# Patient Record
Sex: MALE | Race: WHITE | NOT HISPANIC OR LATINO | URBAN - METROPOLITAN AREA
[De-identification: names, ages, dates, MRNs, and addresses within clinical notes are randomized per-mention and may not be internally consistent; named-entity substitution may affect disease eponyms.]

---

## 2017-12-09 ENCOUNTER — EMERGENCY (EMERGENCY)
Facility: HOSPITAL | Age: 47
LOS: 1 days | Discharge: HOME | End: 2017-12-09
Attending: EMERGENCY MEDICINE | Admitting: EMERGENCY MEDICINE
Payer: COMMERCIAL

## 2017-12-09 VITALS — WEIGHT: 190.04 LBS | HEIGHT: 69 IN

## 2017-12-09 VITALS
HEART RATE: 91 BPM | SYSTOLIC BLOOD PRESSURE: 132 MMHG | OXYGEN SATURATION: 98 % | TEMPERATURE: 98 F | DIASTOLIC BLOOD PRESSURE: 87 MMHG | RESPIRATION RATE: 18 BRPM

## 2017-12-09 DIAGNOSIS — Z98.890 OTHER SPECIFIED POSTPROCEDURAL STATES: Chronic | ICD-10-CM

## 2017-12-09 PROCEDURE — 73564 X-RAY EXAM KNEE 4 OR MORE: CPT

## 2017-12-09 PROCEDURE — 99283 EMERGENCY DEPT VISIT LOW MDM: CPT | Mod: 25

## 2017-12-09 PROCEDURE — 99284 EMERGENCY DEPT VISIT MOD MDM: CPT

## 2017-12-09 PROCEDURE — 73564 X-RAY EXAM KNEE 4 OR MORE: CPT | Mod: 26,LT

## 2017-12-09 RX ORDER — ESOMEPRAZOLE MAGNESIUM 40 MG/1
0 CAPSULE, DELAYED RELEASE ORAL
Qty: 0 | Refills: 0 | COMMUNITY

## 2017-12-09 RX ORDER — IBUPROFEN 200 MG
600 TABLET ORAL ONCE
Qty: 0 | Refills: 0 | Status: COMPLETED | OUTPATIENT
Start: 2017-12-09 | End: 2017-12-09

## 2017-12-09 RX ADMIN — Medication 600 MILLIGRAM(S): at 14:40

## 2017-12-09 RX ADMIN — Medication 600 MILLIGRAM(S): at 14:06

## 2017-12-09 NOTE — ED PROVIDER NOTE - MUSCULOSKELETAL, MLM
L knee: ROM limited 2/2 pain. +swelling in posterior patellar fossa, tenderness overlying. No medial, lateral, or anterior TTP. negative anterior and posterior drawer test. Subjective pain elicited on internal rotation of knee. +Some lumbar paraspinal TTP. FROM of toes and ankles of L leg. Sensation intact in L foot. Cap refill <2 seconds in all toes.

## 2017-12-09 NOTE — ED ADULT NURSE NOTE - ED STAT RN HANDOFF DETAILS
hand off given to oncoming RN. Awaiting xray results hand off given to oncoming RN Marissa Vee. Awaiting xray results

## 2017-12-09 NOTE — ED PROVIDER NOTE - ATTENDING CONTRIBUTION TO CARE
48yo M [facility]  BIBEMS for L knee pain and posterior knee swelling. Pt states was at work ambulating when L foot went into a pothole causing his lower leg to turn opposite direction of his upper leg and body. No fall to ground. No joint dislocation. Has been ambulatory, however notes pain and swelling to back of L knee worse with ambulation and palpation. Palp DP and PT pulses on L, thigh and calf compartments are soft. L knee w TTP and mild swelling to posterior aspect of knee. Anterior and posterior drawer test with no ligamentous laxity. Suspect muscular injury. Will obtain Xrays, place knee immobilizer, crutches and send for FU with [facility] physician and ultimate ortho referral.

## 2017-12-09 NOTE — ED PROVIDER NOTE - MEDICAL DECISION MAKING DETAILS
47M p/w L knee injury s/p stepping into pothole with twisting motion. Mechanism and PE consistent with ligamentous vs muscular vs musculoligamentous injury. Unlikely to be dislocated or fx, but will order xrays to rule out definitively. Ibuprofen for swelling. Will apply knee immobilizer, give crutches. Pt will follow up with his [facility] doctor tomorrow and will get an orthopedic referral there. 47M p/w L knee injury s/p stepping into pothole with twisting motion. Mechanism and PE consistent with ligamentous vs muscular vs musculoligamentous injury. Unlikely to be dislocated or fx, but will order xrays to rule out definitively. Ibuprofen for swelling. Will apply knee immobilizer, give crutches. Pt will follow up with his [facility] doctor tomorrow and will get an orthopedic referral there.  Justice: See attending statement below

## 2017-12-09 NOTE — ED PROVIDER NOTE - CARE PLAN
Principal Discharge DX:	Injury of left knee, initial encounter  Instructions for follow-up, activity and diet:	You were seen today for an injury of your left knee. We ruled out a fracture and dislocation with the xray. However, it seems like you most likely have an injury to your muscles, ligaments of the knee, or a combination of both. You need to see an orthopedist so please get a referral from your [facility] physician tomorrow. Please keep the knee elevated, apply ice to the back of the knee, and take up to 600mg of ibuprofen (motrin or advil) every 6 hours for pain and swelling. Keep the knee immobilizer on until you see your doctor and use the crutches to walk.  If you start having numbness of the leg, being unable to move your toes, any difference in the pain that you're having, or for any other emergent concern please come back to the ED.

## 2017-12-09 NOTE — ED PROVIDER NOTE - PLAN OF CARE
You were seen today for an injury of your left knee. We ruled out a fracture and dislocation with the xray. However, it seems like you most likely have an injury to your muscles, ligaments of the knee, or a combination of both. You need to see an orthopedist so please get a referral from your [facility] physician tomorrow. Please keep the knee elevated, apply ice to the back of the knee, and take up to 600mg of ibuprofen (motrin or advil) every 6 hours for pain and swelling. Keep the knee immobilizer on until you see your doctor and use the crutches to walk.  If you start having numbness of the leg, being unable to move your toes, any difference in the pain that you're having, or for any other emergent concern please come back to the ED.

## 2017-12-09 NOTE — ED ADULT NURSE NOTE - CHIEF COMPLAINT
The patient is a 47y Male complaining of pain, knee. 4 x weekly since age 14, however reports last use was weeks ago

## 2017-12-09 NOTE — ED ADULT NURSE NOTE - OBJECTIVE STATEMENT
1255 47 yr old WM brought to ER via ambulance in wheelchair from triage. c/o severe left posterior knee pain and walking with limp x 90 minutes. Pt is a . At work today twisted and felt a sharp shooting pain in left knee. Palpable painful lump/swelling at left posterior knee. Denies dizziness, palp, SOB or chest pain. A&Ox3. color pink. skin W&D. lungs clear. abd soft

## 2017-12-09 NOTE — ED PROVIDER NOTE - OBJECTIVE STATEMENT
47M smoker, PMH GERD c/o L knee pain. Pt is a . Was walking on flat ground today and foot went into pothole and he suffered a twisting motion of his L knee and felt immediate pain in the knee. He kept walking around but then felt behind his knee and felt an "egg" of swelling. No numbness and  tingling in leg or feet. Able to bear weight but with a lot of pain. Reports some pain in the R lower back that he is feeling now. No prior injuries in the knee.

## 2021-07-12 PROBLEM — K21.9 GASTRO-ESOPHAGEAL REFLUX DISEASE WITHOUT ESOPHAGITIS: Chronic | Status: ACTIVE | Noted: 2017-12-09

## 2021-07-16 ENCOUNTER — APPOINTMENT (OUTPATIENT)
Dept: GASTROENTEROLOGY | Facility: CLINIC | Age: 51
End: 2021-07-16

## 2021-07-23 ENCOUNTER — APPOINTMENT (OUTPATIENT)
Dept: GASTROENTEROLOGY | Facility: CLINIC | Age: 51
End: 2021-07-23
Payer: COMMERCIAL

## 2021-07-23 VITALS
WEIGHT: 212 LBS | SYSTOLIC BLOOD PRESSURE: 128 MMHG | HEIGHT: 69 IN | DIASTOLIC BLOOD PRESSURE: 82 MMHG | BODY MASS INDEX: 31.4 KG/M2 | TEMPERATURE: 98 F

## 2021-07-23 DIAGNOSIS — Z87.891 PERSONAL HISTORY OF NICOTINE DEPENDENCE: ICD-10-CM

## 2021-07-23 PROCEDURE — 99214 OFFICE O/P EST MOD 30 MIN: CPT

## 2021-07-23 RX ORDER — PANTOPRAZOLE SODIUM 40 MG/1
40 TABLET, DELAYED RELEASE ORAL
Refills: 0 | Status: ACTIVE | COMMUNITY

## 2021-07-23 RX ORDER — FAMOTIDINE 40 MG/1
40 TABLET, FILM COATED ORAL
Refills: 0 | Status: ACTIVE | COMMUNITY

## 2021-07-23 NOTE — HISTORY OF PRESENT ILLNESS
[___ Month(s) Ago] : [unfilled] month(s) ago [None] : no changes [Nausea] : denies nausea [Vomiting] : denies vomiting [Diarrhea] : denies diarrhea [Constipation] : denies constipation [Yellow Skin Or Eyes (Jaundice)] : denies jaundice [Abdominal Pain] : denies abdominal pain [Abdominal Swelling] : denies abdominal swelling [Rectal Pain] : denies rectal pain [Wt Gain ___ Lbs] : recent [unfilled] ~Upound(s) weight gain [Heartburn] : heartburn [GERD] : gastroesophageal reflux disease [_________] : Performed [unfilled] [Good Compliance] : good compliance with treatment [Good Tolerance] : good tolerance of treatment [Good Symptom Control] : good symptom control [Wt Loss ___ Lbs] : no recent weight loss [Hiatus Hernia] : no hiatus hernia [Peptic Ulcer Disease] : no peptic ulcer disease [Pancreatitis] : no pancreatitis [Kidney Stone] : no kidney stone [Inflammatory Bowel Disease] : no inflammatory bowel disease [Irritable Bowel Syndrome] : no irritable bowel syndrome [Diverticulitis] : no diverticulitis [Alcohol Abuse] : no alcohol abuse [Malignancy] : no malignancy [Abdominal Surgery] : no abdominal surgery [Appendectomy] : no appendectomy [Cholecystectomy] : no cholecystectomy [de-identified] : worsening gerd [de-identified] : Patient is a 50-year-old male, retired , who received the COVID-19 vaccine. He presents today for an urgent visit due to a new onset of GERD. He is status post fundoplication in 2018. He has been off of his antiacid medications until recently when he resume pantoprazole in the morning and as needed famotidine 40 mg. There is no dysphagia or vomiting. There is no change in bowel habits. There is no chronic cough. He does have his baseline asthmatic symptoms. [de-identified] : neg [de-identified] : neg

## 2021-08-02 ENCOUNTER — APPOINTMENT (OUTPATIENT)
Dept: GASTROENTEROLOGY | Facility: AMBULATORY SURGERY CENTER | Age: 51
End: 2021-08-02
Payer: COMMERCIAL

## 2021-08-02 PROCEDURE — 43239 EGD BIOPSY SINGLE/MULTIPLE: CPT

## 2021-08-11 ENCOUNTER — NON-APPOINTMENT (OUTPATIENT)
Age: 51
End: 2021-08-11

## 2022-03-18 NOTE — ED ADULT NURSE NOTE - FALL HARM RISK CONCLUSION
CM notified by Morteza Cortes with [facility] that patient is accepted and may admit to room 914 pending negative rapid Covid;  Order placed. If testing negative patient report can be called to 1938. Attending notified and discharge order noted. Care Management Interventions  PCP Verified by CM: Yes  Mode of Transport at Discharge:  Other (see comment)  Transition of Care Consult (CM Consult): Discharge Planning  Discharge Durable Medical Equipment: No  Physical Therapy Consult: Yes  Occupational Therapy Consult: Yes  Speech Therapy Consult: No  Support Systems: Spouse/Significant Other  Confirm Follow Up Transport: Family  The Patient and/or Patient Representative was Provided with a Choice of Provider and Agrees with the Discharge Plan?: No  Freedom of Choice List was Provided with Basic Dialogue that Supports the Patient's Individualized Plan of Care/Goals, Treatment Preferences and Shares the Quality Data Associated with the Providers?: No  Oscar Resource Information Provided?: No  Discharge Location  Patient Expects to be Discharged to[de-identified] Rehab hospital/unit acute Fall Risk

## 2022-09-12 ENCOUNTER — APPOINTMENT (OUTPATIENT)
Dept: GASTROENTEROLOGY | Facility: CLINIC | Age: 52
End: 2022-09-12

## 2022-09-12 VITALS
DIASTOLIC BLOOD PRESSURE: 82 MMHG | SYSTOLIC BLOOD PRESSURE: 121 MMHG | BODY MASS INDEX: 30.21 KG/M2 | HEIGHT: 69 IN | WEIGHT: 204 LBS

## 2022-09-12 VITALS — RESPIRATION RATE: 12 BRPM | HEART RATE: 68 BPM

## 2022-09-12 DIAGNOSIS — U07.1 COVID-19: ICD-10-CM

## 2022-09-12 DIAGNOSIS — J38.1 POLYP OF VOCAL CORD AND LARYNX: ICD-10-CM

## 2022-09-12 DIAGNOSIS — R10.12 LEFT UPPER QUADRANT PAIN: ICD-10-CM

## 2022-09-12 PROCEDURE — 99214 OFFICE O/P EST MOD 30 MIN: CPT

## 2022-09-12 RX ORDER — MELOXICAM 15 MG/1
15 TABLET ORAL
Qty: 14 | Refills: 0 | Status: ACTIVE | COMMUNITY
Start: 2022-09-12 | End: 1900-01-01

## 2022-09-12 NOTE — PHYSICAL EXAM

## 2022-09-12 NOTE — REVIEW OF SYSTEMS
[Negative] : Heme/Lymph [As Noted in HPI] : as noted in HPI [Abdominal Pain] : abdominal pain [Diarrhea] : diarrhea [Heartburn] : heartburn

## 2022-09-12 NOTE — ASSESSMENT
[FreeTextEntry1] : 51-year-old male history of chronic GERD status post fundoplication who had Covid 2months.  He is lost approximately 10 pounds over the last 6 months in a voluntary manner.  He has occasional GERD and some left upper quadrant/rib pain.  There is no nausea or vomiting or change in bowel habits.  There is no shortness of breath or chest pain.  He is up-to-date with colon cancer screening.  Since having COVID has had loose bowel movements without blood and no nocturnal symptoms.  He is using a probiotic each day.\par \par Plan:\par 1. Sonogram of abdomen\par 2. Stool testing for calprotectin, hpylori ag\par 3. Trial of meloxicam for 14 days\par 4. Pt to call within 10 days to report on sxs and stool testing and response to Meloxicam

## 2022-09-23 ENCOUNTER — NON-APPOINTMENT (OUTPATIENT)
Age: 52
End: 2022-09-23

## 2022-10-03 ENCOUNTER — APPOINTMENT (OUTPATIENT)
Dept: ULTRASOUND IMAGING | Facility: CLINIC | Age: 52
End: 2022-10-03

## 2022-10-03 ENCOUNTER — OUTPATIENT (OUTPATIENT)
Dept: OUTPATIENT SERVICES | Facility: HOSPITAL | Age: 52
LOS: 1 days | End: 2022-10-03
Payer: COMMERCIAL

## 2022-10-03 DIAGNOSIS — R10.12 LEFT UPPER QUADRANT PAIN: ICD-10-CM

## 2022-10-03 DIAGNOSIS — Z98.890 OTHER SPECIFIED POSTPROCEDURAL STATES: Chronic | ICD-10-CM

## 2022-10-03 PROCEDURE — 76700 US EXAM ABDOM COMPLETE: CPT

## 2022-10-03 PROCEDURE — 76700 US EXAM ABDOM COMPLETE: CPT | Mod: 26

## 2022-10-27 ENCOUNTER — APPOINTMENT (OUTPATIENT)
Dept: GASTROENTEROLOGY | Facility: CLINIC | Age: 52
End: 2022-10-27

## 2022-10-27 VITALS
BODY MASS INDEX: 31.55 KG/M2 | DIASTOLIC BLOOD PRESSURE: 86 MMHG | OXYGEN SATURATION: 99 % | TEMPERATURE: 97 F | HEIGHT: 69 IN | WEIGHT: 213 LBS | RESPIRATION RATE: 12 BRPM | HEART RATE: 84 BPM | SYSTOLIC BLOOD PRESSURE: 128 MMHG

## 2022-10-27 PROCEDURE — 99214 OFFICE O/P EST MOD 30 MIN: CPT

## 2022-10-27 NOTE — PHYSICAL EXAM
[Alert] : alert [Normal Voice/Communication] : normal voice/communication [Healthy Appearing] : healthy appearing [No Acute Distress] : no acute distress [Sclera] : the sclera and conjunctiva were normal [Hearing Threshold Finger Rub Not [place]] : hearing was normal [Normal Lips/Gums] : the lips and gums were normal [Oropharynx] : the oropharynx was normal [Normal Appearance] : the appearance of the neck was normal [No Neck Mass] : no neck mass was observed [No Respiratory Distress] : no respiratory distress [No Acc Muscle Use] : no accessory muscle use [Respiration, Rhythm And Depth] : normal respiratory rhythm and effort [Auscultation Breath Sounds / Voice Sounds] : lungs were clear to auscultation bilaterally [Heart Rate And Rhythm] : heart rate was normal and rhythm regular [Normal S1, S2] : normal S1 and S2 [Murmurs] : no murmurs [Bowel Sounds] : normal bowel sounds [Abdomen Tenderness] : non-tender [No Masses] : no abdominal mass palpated [Abdomen Soft] : soft [] : no hepatosplenomegaly [Oriented To Time, Place, And Person] : oriented to person, place, and time

## 2023-01-19 ENCOUNTER — APPOINTMENT (OUTPATIENT)
Dept: GASTROENTEROLOGY | Facility: CLINIC | Age: 53
End: 2023-01-19
Payer: COMMERCIAL

## 2023-01-19 VITALS
HEART RATE: 93 BPM | RESPIRATION RATE: 12 BRPM | SYSTOLIC BLOOD PRESSURE: 120 MMHG | TEMPERATURE: 97.2 F | DIASTOLIC BLOOD PRESSURE: 80 MMHG | OXYGEN SATURATION: 99 %

## 2023-01-19 VITALS — HEIGHT: 69 IN | BODY MASS INDEX: 30.36 KG/M2 | WEIGHT: 205 LBS

## 2023-01-19 DIAGNOSIS — E66.9 OBESITY, UNSPECIFIED: ICD-10-CM

## 2023-01-19 DIAGNOSIS — R19.8 OTHER SPECIFIED SYMPTOMS AND SIGNS INVOLVING THE DIGESTIVE SYSTEM AND ABDOMEN: ICD-10-CM

## 2023-01-19 DIAGNOSIS — K21.9 GASTRO-ESOPHAGEAL REFLUX DISEASE W/OUT ESOPHAGITIS: ICD-10-CM

## 2023-01-19 DIAGNOSIS — J45.909 UNSPECIFIED ASTHMA, UNCOMPLICATED: ICD-10-CM

## 2023-01-19 DIAGNOSIS — R06.09 OTHER FORMS OF DYSPNEA: ICD-10-CM

## 2023-01-19 DIAGNOSIS — J44.9 CHRONIC OBSTRUCTIVE PULMONARY DISEASE, UNSPECIFIED: ICD-10-CM

## 2023-01-19 PROCEDURE — 99214 OFFICE O/P EST MOD 30 MIN: CPT | Mod: 25

## 2023-01-19 RX ORDER — ORLISTAT 120 MG/1
120 CAPSULE ORAL 3 TIMES DAILY
Qty: 90 | Refills: 0 | Status: DISCONTINUED | COMMUNITY
Start: 2022-10-27 | End: 2023-01-19

## 2023-01-19 RX ORDER — METHYLPREDNISOLONE 4 MG/1
4 TABLET ORAL
Qty: 1 | Refills: 0 | Status: ACTIVE | COMMUNITY
Start: 2023-01-19 | End: 1900-01-01

## 2023-01-19 RX ORDER — LIRAGLUTIDE 6 MG/ML
18 INJECTION, SOLUTION SUBCUTANEOUS
Qty: 1 | Refills: 6 | Status: DISCONTINUED | COMMUNITY
Start: 2022-10-28 | End: 2023-01-19

## 2023-01-19 RX ORDER — ORLISTAT 60 MG/1
60 CAPSULE ORAL 3 TIMES DAILY
Qty: 90 | Refills: 1 | Status: DISCONTINUED | COMMUNITY
Start: 2022-10-27 | End: 2023-01-19

## 2023-01-19 NOTE — PHYSICAL EXAM

## 2023-01-19 NOTE — ASSESSMENT
[FreeTextEntry1] : 51-year-old male history of chronic GERD status post fundoplication who had Covid 2months.  He is lost approximately 10 pounds over the last 6 months in a voluntary manner.  He has occasional GERD and some left upper quadrant/rib pain.  There is no nausea or vomiting or change in bowel habits.  There is no shortness of breath or chest pain.  He is up-to-date with colon cancer screening.  Since having COVID has had loose bowel movements without blood and no nocturnal symptoms.  He is using a probiotic each day.\par \par Returns today in follow-up.  His rib pain persist.  He has lost 8 pounds voluntarily.  He has asthma exacerbation cough and sore throat without any productive sputum and occasional wheezing.  He is interested in weight loss treatment including nutritional therapy

## 2023-01-19 NOTE — REVIEW OF SYSTEMS
[Sore Throat] : sore throat [As Noted in HPI] : as noted in HPI [SOB on Exertion] : shortness of breath during exertion [Negative] : Heme/Lymph

## 2023-01-19 NOTE — REVIEW OF SYSTEMS
[As Noted in HPI] : as noted in HPI [Abdominal Pain] : abdominal pain [Diarrhea] : diarrhea [Heartburn] : heartburn [Negative] : Gastrointestinal

## 2023-01-19 NOTE — ASSESSMENT
[FreeTextEntry1] : 51-year-old male history of chronic GERD status post fundoplication who had Covid 2months.  He is lost approximately 10 pounds over the last 6 months in a voluntary manner.  He has occasional GERD and some left upper quadrant/rib pain.  There is no nausea or vomiting or change in bowel habits.  There is no shortness of breath or chest pain.  He is up-to-date with colon cancer screening.  Since having COVID has had loose bowel movements without blood and no nocturnal symptoms.  He is using a probiotic each day.  He is struggling to use weight and would like assistance with medication. His bowels have normalized. Previous issues likely related to covid. Recent abdominal sonogram was NORMAL.\par \par Returns today in followup 1/19/23. Lost 8lbs since last visit. Feeling a bit better. Hx of GERD.  He has a mild cough and sore throat that he relates to his COPD exacerbation.  He is interested in losing more weight and is requesting nutritional evaluation.\par \par 1. Medrol pack\par 2. Cbc, cmp, testerone, lipids, a1c, b12, folate, iron\par 3. RTO 4 weeks\par 4. Excercise and weight loss encouraged.

## 2023-01-19 NOTE — HISTORY OF PRESENT ILLNESS
[de-identified] : 08/02/2021-  Gastritis, esophagitis. [FreeTextEntry1] : 05/08/2019- normal [de-identified] : 10/03/2022 normal

## 2023-01-20 LAB
ALBUMIN SERPL ELPH-MCNC: 4.8 G/DL
ALP BLD-CCNC: 52 U/L
ALT SERPL-CCNC: 23 U/L
ANION GAP SERPL CALC-SCNC: 16 MMOL/L
AST SERPL-CCNC: 20 U/L
BASOPHILS # BLD AUTO: 0.04 K/UL
BASOPHILS NFR BLD AUTO: 0.5 %
BILIRUB SERPL-MCNC: 0.5 MG/DL
BUN SERPL-MCNC: 18 MG/DL
CALCIUM SERPL-MCNC: 10.1 MG/DL
CHLORIDE SERPL-SCNC: 100 MMOL/L
CHOLEST SERPL-MCNC: 310 MG/DL
CO2 SERPL-SCNC: 24 MMOL/L
CREAT SERPL-MCNC: 1.05 MG/DL
CRP SERPL-MCNC: 7 MG/L
EGFR: 85 ML/MIN/1.73M2
EOSINOPHIL # BLD AUTO: 0.15 K/UL
EOSINOPHIL NFR BLD AUTO: 1.7 %
ESTIMATED AVERAGE GLUCOSE: 97 MG/DL
FERRITIN SERPL-MCNC: 112 NG/ML
GLUCOSE SERPL-MCNC: 97 MG/DL
HBA1C MFR BLD HPLC: 5 %
HCT VFR BLD CALC: 51.7 %
HDLC SERPL-MCNC: 55 MG/DL
HGB BLD-MCNC: 16.5 G/DL
IMM GRANULOCYTES NFR BLD AUTO: 0.3 %
IRON SATN MFR SERPL: 21 %
IRON SERPL-MCNC: 78 UG/DL
LDLC SERPL CALC-MCNC: 217 MG/DL
LYMPHOCYTES # BLD AUTO: 0.82 K/UL
LYMPHOCYTES NFR BLD AUTO: 9.4 %
MAN DIFF?: NORMAL
MCHC RBC-ENTMCNC: 30.2 PG
MCHC RBC-ENTMCNC: 31.9 GM/DL
MCV RBC AUTO: 94.7 FL
MONOCYTES # BLD AUTO: 0.59 K/UL
MONOCYTES NFR BLD AUTO: 6.8 %
NEUTROPHILS # BLD AUTO: 7.07 K/UL
NEUTROPHILS NFR BLD AUTO: 81.3 %
NONHDLC SERPL-MCNC: 255 MG/DL
PLATELET # BLD AUTO: 174 K/UL
POTASSIUM SERPL-SCNC: 4.5 MMOL/L
PROT SERPL-MCNC: 7.2 G/DL
RBC # BLD: 5.46 M/UL
RBC # FLD: 13.1 %
SODIUM SERPL-SCNC: 140 MMOL/L
TIBC SERPL-MCNC: 378 UG/DL
TRIGL SERPL-MCNC: 191 MG/DL
TSH SERPL-ACNC: 2.21 UIU/ML
UIBC SERPL-MCNC: 300 UG/DL
VIT B12 SERPL-MCNC: 494 PG/ML
WBC # FLD AUTO: 8.7 K/UL

## 2025-01-21 ENCOUNTER — APPOINTMENT (OUTPATIENT)
Dept: GASTROENTEROLOGY | Facility: CLINIC | Age: 55
End: 2025-01-21
Payer: COMMERCIAL

## 2025-01-21 VITALS
HEART RATE: 88 BPM | SYSTOLIC BLOOD PRESSURE: 128 MMHG | HEIGHT: 69 IN | DIASTOLIC BLOOD PRESSURE: 80 MMHG | WEIGHT: 208 LBS | OXYGEN SATURATION: 98 % | RESPIRATION RATE: 12 BRPM | BODY MASS INDEX: 30.81 KG/M2

## 2025-01-21 DIAGNOSIS — Z12.11 ENCOUNTER FOR SCREENING FOR MALIGNANT NEOPLASM OF COLON: ICD-10-CM

## 2025-01-21 PROCEDURE — 99213 OFFICE O/P EST LOW 20 MIN: CPT

## 2025-01-21 PROCEDURE — G2211 COMPLEX E/M VISIT ADD ON: CPT

## 2025-01-21 RX ORDER — SODIUM SULFATE, POTASSIUM SULFATE AND MAGNESIUM SULFATE 1.6; 3.13; 17.5 G/177ML; G/177ML; G/177ML
17.5-3.13-1.6 SOLUTION ORAL
Qty: 354 | Refills: 0 | Status: ACTIVE | COMMUNITY
Start: 2025-01-21 | End: 1900-01-01

## 2025-01-23 ENCOUNTER — APPOINTMENT (OUTPATIENT)
Dept: GASTROENTEROLOGY | Facility: AMBULATORY SURGERY CENTER | Age: 55
End: 2025-01-23
Payer: COMMERCIAL

## 2025-01-23 ENCOUNTER — RESULT REVIEW (OUTPATIENT)
Age: 55
End: 2025-01-23

## 2025-01-23 PROCEDURE — 43239 EGD BIOPSY SINGLE/MULTIPLE: CPT

## 2025-01-23 PROCEDURE — 45378 DIAGNOSTIC COLONOSCOPY: CPT

## 2025-01-24 ENCOUNTER — NON-APPOINTMENT (OUTPATIENT)
Age: 55
End: 2025-01-24

## 2025-01-24 RX ORDER — VONOPRAZAN FUMARATE 13.36 MG/1
10 TABLET ORAL
Qty: 30 | Refills: 1 | Status: ACTIVE | OUTPATIENT
Start: 2025-01-24 | End: 1900-01-01